# Patient Record
Sex: FEMALE | Race: WHITE | Employment: FULL TIME | URBAN - METROPOLITAN AREA
[De-identification: names, ages, dates, MRNs, and addresses within clinical notes are randomized per-mention and may not be internally consistent; named-entity substitution may affect disease eponyms.]

---

## 2019-05-27 ENCOUNTER — APPOINTMENT (OUTPATIENT)
Dept: CT IMAGING | Age: 24
End: 2019-05-27
Payer: COMMERCIAL

## 2019-05-27 ENCOUNTER — HOSPITAL ENCOUNTER (EMERGENCY)
Age: 24
Discharge: HOME | End: 2019-05-27
Attending: EMERGENCY MEDICINE
Payer: COMMERCIAL

## 2019-05-27 VITALS
WEIGHT: 119.71 LBS | TEMPERATURE: 98.5 F | HEART RATE: 83 BPM | RESPIRATION RATE: 15 BRPM | BODY MASS INDEX: 22.03 KG/M2 | SYSTOLIC BLOOD PRESSURE: 108 MMHG | HEIGHT: 62 IN | DIASTOLIC BLOOD PRESSURE: 60 MMHG | OXYGEN SATURATION: 99 %

## 2019-05-27 DIAGNOSIS — S09.90XA CLOSED HEAD INJURY, INITIAL ENCOUNTER: ICD-10-CM
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
## 2019-05-27 DIAGNOSIS — M54.50 ACUTE BILATERAL LOW BACK PAIN WITHOUT SCIATICA: ICD-10-CM

## 2019-05-27 DIAGNOSIS — R51.9 NONINTRACTABLE HEADACHE, UNSPECIFIED CHRONICITY PATTERN, UNSPECIFIED HEADACHE TYPE: ICD-10-CM

## 2019-05-27 LAB
BILIRUBIN URINE: NEGATIVE
BLOOD, URINE: NEGATIVE
CLARITY: CLEAR
COLOR: YELLOW
GLUCOSE URINE: NEGATIVE MG/DL
HCG(URINE) PREGNANCY TEST: NEGATIVE
KETONES, URINE: NEGATIVE MG/DL
LEUKOCYTE ESTERASE, URINE: NEGATIVE
MICROSCOPIC EXAMINATION: NORMAL
NITRITE, URINE: NEGATIVE
PH UA: 6 (ref 5–8)
PROTEIN UA: NEGATIVE MG/DL
SPECIFIC GRAVITY UA: 1.01 (ref 1–1.03)
URINE REFLEX TO CULTURE: NORMAL
URINE TYPE: NORMAL
UROBILINOGEN, URINE: 0.2 E.U./DL

## 2019-05-27 PROCEDURE — 70450 CT HEAD/BRAIN W/O DYE: CPT

## 2019-05-27 PROCEDURE — 99284 EMERGENCY DEPT VISIT MOD MDM: CPT

## 2019-05-27 PROCEDURE — 72131 CT LUMBAR SPINE W/O DYE: CPT

## 2019-05-27 PROCEDURE — 81003 URINALYSIS AUTO W/O SCOPE: CPT

## 2019-05-27 PROCEDURE — 6370000000 HC RX 637 (ALT 250 FOR IP): Performed by: NURSE PRACTITIONER

## 2019-05-27 PROCEDURE — 72125 CT NECK SPINE W/O DYE: CPT

## 2019-05-27 PROCEDURE — 84703 CHORIONIC GONADOTROPIN ASSAY: CPT

## 2019-05-27 RX ORDER — ACETAMINOPHEN 500 MG
1000 TABLET ORAL ONCE
Status: COMPLETED | OUTPATIENT
Start: 2019-05-27 | End: 2019-05-27

## 2019-05-27 RX ORDER — CYCLOBENZAPRINE HCL 10 MG
10 TABLET ORAL 3 TIMES DAILY PRN
Qty: 20 TABLET | Refills: 0 | Status: SHIPPED | OUTPATIENT
Start: 2019-05-27 | End: 2019-06-03

## 2019-05-27 RX ORDER — KETOROLAC TROMETHAMINE 30 MG/ML
30 INJECTION, SOLUTION INTRAMUSCULAR; INTRAVENOUS ONCE
Status: DISCONTINUED | OUTPATIENT
Start: 2019-05-27 | End: 2019-05-27

## 2019-05-27 RX ORDER — NAPROXEN 500 MG/1
500 TABLET ORAL 2 TIMES DAILY
Qty: 60 TABLET | Refills: 0 | Status: SHIPPED | OUTPATIENT
Start: 2019-05-27

## 2019-05-27 RX ADMIN — ACETAMINOPHEN 1000 MG: 500 TABLET ORAL at 20:23

## 2019-05-27 ASSESSMENT — PAIN DESCRIPTION - DESCRIPTORS
DESCRIPTORS_2: CONSTANT
DESCRIPTORS: THROBBING
DESCRIPTORS: ACHING

## 2019-05-27 ASSESSMENT — PAIN DESCRIPTION - LOCATION
LOCATION_2: BACK
LOCATION: HEAD
LOCATION: HEAD

## 2019-05-27 ASSESSMENT — PAIN - FUNCTIONAL ASSESSMENT
PAIN_FUNCTIONAL_ASSESSMENT: PREVENTS OR INTERFERES SOME ACTIVE ACTIVITIES AND ADLS
PAIN_FUNCTIONAL_ASSESSMENT: PREVENTS OR INTERFERES SOME ACTIVE ACTIVITIES AND ADLS

## 2019-05-27 ASSESSMENT — PAIN DESCRIPTION - ONSET
ONSET_2: ON-GOING
ONSET: ON-GOING
ONSET: ON-GOING

## 2019-05-27 ASSESSMENT — PAIN DESCRIPTION - PAIN TYPE
TYPE: ACUTE PAIN
TYPE: ACUTE PAIN
TYPE_2: CHRONIC PAIN

## 2019-05-27 ASSESSMENT — PAIN SCALES - GENERAL
PAINLEVEL_OUTOF10: 0
PAINLEVEL_OUTOF10: 8
PAINLEVEL_OUTOF10: 8
PAINLEVEL_OUTOF10: 0

## 2019-05-27 ASSESSMENT — PAIN DESCRIPTION - ORIENTATION
ORIENTATION: LEFT;RIGHT
ORIENTATION_2: LOWER

## 2019-05-27 ASSESSMENT — PAIN DESCRIPTION - INTENSITY: RATING_2: 4

## 2019-05-27 ASSESSMENT — PAIN DESCRIPTION - PROGRESSION
CLINICAL_PROGRESSION_2: NOT CHANGED
CLINICAL_PROGRESSION: NOT CHANGED
CLINICAL_PROGRESSION: NOT CHANGED

## 2019-05-27 ASSESSMENT — PAIN DESCRIPTION - FREQUENCY
FREQUENCY: CONTINUOUS
FREQUENCY: CONTINUOUS

## 2019-05-27 ASSESSMENT — PAIN DESCRIPTION - DURATION: DURATION_2: CONTINUOUS
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
## 2019-05-28 NOTE — ED NOTES
Pt refuses toradol IM and Tetanus shot. Pt reports she she has had tetanus shot in the last 5 years. Provider notified.      Sanket Tinoco RN  05/27/19 2052
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
